# Patient Record
(demographics unavailable — no encounter records)

---

## 2025-02-24 NOTE — DATA REVIEWED
[de-identified] : leg length AP radiographs were obtained  and independently reviewed during today's visit 02/21/25 . S/P  bilateral proximal tibia osteotomy and bilateral distal femur osteotomy with IMN retrograde for the femur Bones are in normal alignment. Joint spaces are preserved

## 2025-02-24 NOTE — HISTORY OF PRESENT ILLNESS
[FreeTextEntry1] : Trey is a pleasant  19 yo male who came to my office for further management of bilateral genu varus He underwent bilateral proximal tibia osteotomy and bilateral distal femur osteotomy  5 and 10 months ago in [facility] Overall he is doing well, the osteotomy healed and he is here for transfer of care cause of distance He do have anterior  knee pain but no swelling or sign of infection

## 2025-02-24 NOTE — PHYSICAL EXAM
[FreeTextEntry1] : General: Patient is awake and alert and in no acute distress . oriented to person, place. well developed, well nourished, cooperative.   Skin: The skin is intact, warm, pink, and dry over the area examined.    Eyes: normal conjunctiva, normal eyelids and pupils were equal and round.   ENT: normal ears, normal nose and normal lips.  Cardiovascular: There is brisk capillary refill in the digits of the affected extremity. They are symmetric pulses in the bilateral upper and lower extremities, positive peripheral pulses, brisk capillary refill, but no peripheral edema.  Respiratory: The patient is in no apparent respiratory distress. They're taking full deep breaths without use of accessory muscles or evidence of audible wheezes or stridor without the use of a stethoscope, normal respiratory effort.   Neurological: 5/5 motor strength in the main muscle groups of bilateral lower extremities, sensory intact in bilateral lower extremities.   Musculoskeletal: normal gait for age. good posture. normal clinical alignment in upper and lower extremities. full range of motion in bilateral upper and lower extremities. normal clinical alignment of the spine. both knee with no swelling, normal alignment. full ROM. STABLE With negative Lachman. no meniscal sign. no bony tenderness. NV intact scar healed with no sign of infection

## 2025-02-24 NOTE — REVIEW OF SYSTEMS
[Change in Activity] : no change in activity [Fever Above 102] : no fever [Itching] : no itching [Redness] : no redness [Sore Throat] : no sore throat [Wheezing] : no wheezing [Vomiting] : no vomiting [Limping] : no limping [Joint Pains] : arthralgias [Joint Swelling] : no joint swelling

## 2025-02-24 NOTE — HISTORY OF PRESENT ILLNESS
[FreeTextEntry1] : Trey is a pleasant  19 yo male who came to my office for further management of bilateral genu varus He underwent bilateral proximal tibia osteotomy and bilateral distal femur osteotomy  5 and 10 months ago in [facility] Overall he is doing well, the osteotomy healed and he is here for transfer of care cause of distance He do have anterior  knee pain but no swelling or sign of infection

## 2025-02-24 NOTE — ASSESSMENT
[FreeTextEntry1] : 17 yo male with bilateral genu varus S/P S/P  bilateral proximal tibia osteotomy and bilateral distal femur osteotomy with IMN retrograde for the femur AND ATEGRADE FOR THE TIBIA  Today's visit included obtaining history from the child  parent due to the child's age, the child could not be considered a reliable historian, requiring parent to act as independent historian. Xray was reviewed today demonstrating  normal mechanical axis and osteotomy healed and Long discussion was done with family regarding  diagnosis, treatment options and prognosis He WILL CONTINUE pt FOR STRETCHING ANDF ANTEIRO KNEE PAIN  Follow up in 3 months for Xray of lower extremity and reevaluation  ACTIVITIES AS TOLERATED .This plan was discussed with family. Family verbalizes understanding and agreement of plan. All questions and concerns were addressed today.

## 2025-02-24 NOTE — REASON FOR VISIT
[Initial Evaluation] : an initial evaluation [Mother] : mother [Patient] : patient [FreeTextEntry1] : bilateral genu varus

## 2025-02-24 NOTE — ASSESSMENT
[FreeTextEntry1] : 19 yo male with bilateral genu varus S/P S/P  bilateral proximal tibia osteotomy and bilateral distal femur osteotomy with IMN retrograde for the femur AND ATEGRADE FOR THE TIBIA  Today's visit included obtaining history from the child  parent due to the child's age, the child could not be considered a reliable historian, requiring parent to act as independent historian. Xray was reviewed today demonstrating  normal mechanical axis and osteotomy healed and Long discussion was done with family regarding  diagnosis, treatment options and prognosis He WILL CONTINUE pt FOR STRETCHING ANDF ANTEIRO KNEE PAIN  Follow up in 3 months for Xray of lower extremity and reevaluation  ACTIVITIES AS TOLERATED .This plan was discussed with family. Family verbalizes understanding and agreement of plan. All questions and concerns were addressed today.

## 2025-02-24 NOTE — DATA REVIEWED
[de-identified] : leg length AP radiographs were obtained  and independently reviewed during today's visit 02/21/25 . S/P  bilateral proximal tibia osteotomy and bilateral distal femur osteotomy with IMN retrograde for the femur Bones are in normal alignment. Joint spaces are preserved